# Patient Record
Sex: FEMALE | Race: WHITE | NOT HISPANIC OR LATINO | Employment: FULL TIME | URBAN - METROPOLITAN AREA
[De-identification: names, ages, dates, MRNs, and addresses within clinical notes are randomized per-mention and may not be internally consistent; named-entity substitution may affect disease eponyms.]

---

## 2023-11-01 ENCOUNTER — APPOINTMENT (OUTPATIENT)
Dept: RADIOLOGY | Facility: CLINIC | Age: 48
End: 2023-11-01
Payer: COMMERCIAL

## 2023-11-07 ENCOUNTER — TELEPHONE (OUTPATIENT)
Dept: ORTHOPEDIC SURGERY | Facility: CLINIC | Age: 48
End: 2023-11-07
Payer: COMMERCIAL

## 2023-11-07 NOTE — TELEPHONE ENCOUNTER
Patient called and left a voicemail saying that Dr. Plaza ordered a MRI of the knee for her and she did not go and get it done because she was feeling better. But now she is having pain again. So she tried to schedule for the MRI but the insurance wont cover it they denied it. She wants to know what can she do to get the MRI approved.      Patient can be reached at [phone]  Thank you

## 2023-11-09 NOTE — TELEPHONE ENCOUNTER
Talked to pt and let her know that I reached out to our precert dept to see what needs to be done , to get it approved

## 2023-11-10 DIAGNOSIS — M17.11 OSTEOARTHRITIS OF RIGHT KNEE, UNSPECIFIED OSTEOARTHRITIS TYPE: Primary | ICD-10-CM

## 2023-11-10 DIAGNOSIS — M25.569 KNEE PAIN, UNSPECIFIED CHRONICITY, UNSPECIFIED LATERALITY: Primary | ICD-10-CM

## 2023-12-04 ENCOUNTER — OFFICE VISIT (OUTPATIENT)
Dept: ORTHOPEDIC SURGERY | Facility: CLINIC | Age: 48
End: 2023-12-04
Payer: COMMERCIAL

## 2023-12-04 DIAGNOSIS — S83.231D COMPLEX TEAR OF MEDIAL MENISCUS OF RIGHT KNEE AS CURRENT INJURY, SUBSEQUENT ENCOUNTER: Primary | ICD-10-CM

## 2023-12-04 PROCEDURE — 99214 OFFICE O/P EST MOD 30 MIN: CPT | Performed by: ORTHOPAEDIC SURGERY

## 2023-12-04 NOTE — PROGRESS NOTES
History of Present Illness   Chief Complaint   Patient presents with    Right Knee - Follow-up     Internal derangement  MRI REVIEW today       History of Present Illness   Patient presents after sustaining an injury to their knee.  They are complaining of ache, pain and discomfort.  The knee continues to swell since the injury.  Patient also complains of mechanical symptoms of catching, locking and popping.  Pain is aggravated by use and relieved by rest.  She been treated conservatively since initial visit on 6/19/2023.  Despite conservative treatment pain is persisted.  Imaging  Radiographs Knee: No acute fractures or dislocations.  No arthritic change and well-preserved joint space  MRI: Posterior horn medial meniscus tear.  She has a small flap tear near the posterior horn/root.  Root appears to be intact.  Assessment:   Right knee posterior medial meniscus tear    Plan:  We reviewed the role of imaging, physical therapy, injections and the time frame to healing and correlation with outcome.  Menisectomy plan of care:  Risks benefits and alternatives to surgery were discussed including but not limited to Infection, bleeding, neurovascular injury, pain and dysfunction, hardware related complications including cutout failure breakage, loss of function, motion, and permanent disability as well as the cardiovascular and pulmonary complications from anesthesia including death and DVT. Patient and family accept these risks.  We discussed specifically post meniscectomy pain, incomplete pain relief, meniscus retear, progressive arthritis, intraoperative decisions in regards to other pathology, and the potential for future revision surgeries including arthroplasty  We discussed with her specifically does look like it is a posterior horn tear does not look like a root.  However there is intraoperative decision we may need to make if indeed she has a full-thickness root tear.  She is okay with proceeding with repair if  indicated.  We discussed this in the preoperative area as well  Trip to [place] in January.    Plan for outpatient surgery   1. 1 week postop follow up   2. Percocet for postop pain relief. OARRS has been reviewed and is consistent with prescribed medications. This report is scanned into the electronic medical record. The risks of abuse, dependence, addiction and diversion were considered. The medication is felt to be clinically appropriate based on documented diagnosis .  3.  Pre-CERT for removal postoperative knee brace  Physical Exam  Well-nourished, well-developed. No acute distress. Alert and oriented x3. Responds appropriately to questioning. Good mood. Normal affect.  Physical Exam  Right knee:  Skin healthy and intact  No gross swelling or ecchymosis  Trace to 1+ Effusion:   ROM: 110  Crepitance with range of motion  Pain along the joint line.  Positive Ángel´s test/PositiveApley Grind  Neurovascular exam normal distally  Palpable pedal pulse and good cap refill     Review of Systems   GENERAL: Negative for malaise, significant weight loss, fever  MUSCULOSKELETAL: See HPI  NEURO:  Negative     Past Medical History:   Diagnosis Date    Ganglion, unspecified site     Ganglion cyst       Medication Documentation Review Audit    **Prior to Admission medications have not yet been reviewed**         Not on File    Social History     Socioeconomic History    Marital status:      Spouse name: Not on file    Number of children: Not on file    Years of education: Not on file    Highest education level: Not on file   Occupational History    Not on file   Tobacco Use    Smoking status: Not on file    Smokeless tobacco: Not on file   Substance and Sexual Activity    Alcohol use: Not on file    Drug use: Not on file    Sexual activity: Not on file   Other Topics Concern    Not on file   Social History Narrative    Not on file     Social Determinants of Health     Financial Resource Strain: Not on file   Food  Insecurity: Not on file   Transportation Needs: Not on file   Physical Activity: Not on file   Stress: Not on file   Social Connections: Not on file   Intimate Partner Violence: Not on file   Housing Stability: Not on file       No past surgical history on file.    No results found.

## 2023-12-14 ENCOUNTER — HOSPITAL ENCOUNTER (OUTPATIENT)
Facility: HOSPITAL | Age: 48
Setting detail: OUTPATIENT SURGERY
End: 2023-12-14
Attending: ORTHOPAEDIC SURGERY | Admitting: ORTHOPAEDIC SURGERY
Payer: COMMERCIAL

## 2023-12-14 PROBLEM — S83.241A OTHER TEAR OF MEDIAL MENISCUS, CURRENT INJURY, RIGHT KNEE, INITIAL ENCOUNTER: Status: ACTIVE | Noted: 2023-12-12

## 2024-01-09 ENCOUNTER — TELEPHONE (OUTPATIENT)
Dept: ORTHOPEDIC SURGERY | Facility: CLINIC | Age: 49
End: 2024-01-09
Payer: COMMERCIAL

## 2024-01-09 NOTE — TELEPHONE ENCOUNTER
01/09/24  VM for pt to contact our office re availability of crutches for post-op use following knee sx.  Asked her to let us know whether she has, or needs a pair.

## 2024-01-16 ENCOUNTER — TELEPHONE (OUTPATIENT)
Dept: ORTHOPEDIC SURGERY | Facility: CLINIC | Age: 49
End: 2024-01-16
Payer: COMMERCIAL

## 2024-01-22 DIAGNOSIS — S83.231D COMPLEX TEAR OF MEDIAL MENISCUS OF RIGHT KNEE AS CURRENT INJURY, SUBSEQUENT ENCOUNTER: Primary | ICD-10-CM

## 2024-01-22 RX ORDER — OXYCODONE AND ACETAMINOPHEN 5; 325 MG/1; MG/1
1 TABLET ORAL EVERY 6 HOURS PRN
Qty: 28 TABLET | Refills: 0 | Status: CANCELLED | OUTPATIENT
Start: 2024-01-22 | End: 2024-01-29

## 2024-01-23 ENCOUNTER — APPOINTMENT (OUTPATIENT)
Dept: ORTHOPEDIC SURGERY | Facility: CLINIC | Age: 49
End: 2024-01-23
Payer: COMMERCIAL

## 2024-02-02 ENCOUNTER — APPOINTMENT (OUTPATIENT)
Dept: ORTHOPEDIC SURGERY | Facility: CLINIC | Age: 49
End: 2024-02-02
Payer: COMMERCIAL

## 2025-02-07 NOTE — TELEPHONE ENCOUNTER
01/16/24  spoke w/ pt and she does need crutches.  Scheduled her for S.V. on 01/23/24.   Addended by: NERY TAYLOR on: 2/7/2025 01:59 PM     Modules accepted: Orders

## 2025-05-20 ENCOUNTER — APPOINTMENT (OUTPATIENT)
Dept: PRIMARY CARE | Facility: CLINIC | Age: 50
End: 2025-05-20
Payer: COMMERCIAL

## 2025-05-20 VITALS
DIASTOLIC BLOOD PRESSURE: 74 MMHG | TEMPERATURE: 97.3 F | HEART RATE: 85 BPM | BODY MASS INDEX: 33.67 KG/M2 | WEIGHT: 202.13 LBS | HEIGHT: 65 IN | SYSTOLIC BLOOD PRESSURE: 106 MMHG

## 2025-05-20 DIAGNOSIS — Z23 IMMUNIZATION DUE: ICD-10-CM

## 2025-05-20 DIAGNOSIS — F41.9 ANXIETY: ICD-10-CM

## 2025-05-20 DIAGNOSIS — F90.0 ATTENTION DEFICIT HYPERACTIVITY DISORDER (ADHD), PREDOMINANTLY INATTENTIVE TYPE: ICD-10-CM

## 2025-05-20 DIAGNOSIS — M67.40 GANGLION CYST: Primary | ICD-10-CM

## 2025-05-20 DIAGNOSIS — Z00.00 WELLNESS EXAMINATION: ICD-10-CM

## 2025-05-20 DIAGNOSIS — Z12.11 ENCOUNTER FOR SCREENING FOR MALIGNANT NEOPLASM OF COLON: ICD-10-CM

## 2025-05-20 DIAGNOSIS — Z51.81 ENCOUNTER FOR MEDICATION MONITORING: ICD-10-CM

## 2025-05-20 DIAGNOSIS — Z13.0 SCREENING FOR DEFICIENCY ANEMIA: ICD-10-CM

## 2025-05-20 DIAGNOSIS — L84 CALLUS OF FOOT: ICD-10-CM

## 2025-05-20 DIAGNOSIS — Z13.220 SCREENING FOR LIPID DISORDERS: ICD-10-CM

## 2025-05-20 DIAGNOSIS — Z23 ENCOUNTER FOR ADMINISTRATION OF VACCINE: ICD-10-CM

## 2025-05-20 DIAGNOSIS — D68.51 HOMOZYGOUS FACTOR V LEIDEN MUTATION (MULTI): ICD-10-CM

## 2025-05-20 DIAGNOSIS — Z12.31 ENCOUNTER FOR SCREENING MAMMOGRAM FOR MALIGNANT NEOPLASM OF BREAST: ICD-10-CM

## 2025-05-20 PROBLEM — S83.249A TEAR OF MEDIAL MENISCUS OF KNEE: Status: ACTIVE | Noted: 2023-12-12

## 2025-05-20 PROBLEM — F32.A DEPRESSIVE DISORDER: Status: ACTIVE | Noted: 2025-05-20

## 2025-05-20 PROBLEM — R45.851 SUICIDAL IDEATION: Status: RESOLVED | Noted: 2017-10-12 | Resolved: 2025-05-20

## 2025-05-20 PROCEDURE — 99204 OFFICE O/P NEW MOD 45 MIN: CPT

## 2025-05-20 PROCEDURE — 90471 IMMUNIZATION ADMIN: CPT

## 2025-05-20 PROCEDURE — 3008F BODY MASS INDEX DOCD: CPT

## 2025-05-20 PROCEDURE — 1036F TOBACCO NON-USER: CPT

## 2025-05-20 PROCEDURE — 90715 TDAP VACCINE 7 YRS/> IM: CPT

## 2025-05-20 RX ORDER — DEXTROAMPHETAMINE SACCHARATE, AMPHETAMINE ASPARTATE, DEXTROAMPHETAMINE SULFATE AND AMPHETAMINE SULFATE 5; 5; 5; 5 MG/1; MG/1; MG/1; MG/1
1 TABLET ORAL 3 TIMES DAILY
COMMUNITY

## 2025-05-20 RX ORDER — ALPRAZOLAM 0.5 MG/1
TABLET ORAL
COMMUNITY

## 2025-05-20 ASSESSMENT — PATIENT HEALTH QUESTIONNAIRE - PHQ9
2. FEELING DOWN, DEPRESSED OR HOPELESS: NOT AT ALL
1. LITTLE INTEREST OR PLEASURE IN DOING THINGS: NOT AT ALL
SUM OF ALL RESPONSES TO PHQ9 QUESTIONS 1 AND 2: 0

## 2025-05-20 NOTE — ASSESSMENT & PLAN NOTE
She has a history of these from approximately 1997. Appears there is a new one that has been present for the last few months; she reports its increased in size and is causing more discomfort and irritation. She would like this removed.     Orders:    Referral to Orthopedics and Sports Medicine; Future

## 2025-05-20 NOTE — PROGRESS NOTES
"Efrain Deras \"Iris\" is a 49 y.o. female who presents for New Patient Visit (Growth on toe, thumb and side of upper abd/Thumb bothers more- thumb has got bigger, toe and thumb have gone down/Thumb causes pain).  She is here for a comprehensive wellness examination and wants to address her cysts. Former smoker ; Father history of blood clots multiple strokes including brain stem stroke he is now , mother has scleroderma, factor V, grandparents maternal and paternal + for type 2 diabetes.         ROS negative unless otherwise stated in HPI.     /74   Pulse 85   Temp 36.3 °C (97.3 °F) (Temporal)   Ht 1.651 m (5' 5\")   Wt 91.7 kg (202 lb 2 oz)   LMP 2025 Comment: irregular  BMI 33.64 kg/m²    Objective        Physical Exam  Constitutional:       Appearance: Normal appearance.   HENT:      Head: Normocephalic.      Right Ear: Tympanic membrane, ear canal and external ear normal. There is no impacted cerumen.      Left Ear: Tympanic membrane, ear canal and external ear normal. There is no impacted cerumen.   Cardiovascular:      Rate and Rhythm: Normal rate and regular rhythm.   Pulmonary:      Effort: Pulmonary effort is normal. No respiratory distress.      Breath sounds: Normal breath sounds. No wheezing, rhonchi or rales.   Musculoskeletal:      Cervical back: Neck supple.      Comments: Right Thumb: firm well defined circumoral palpable nodule; non-boggy; no surrounding edema, erythema or purulent drainage    Right 2nd Toe: hardened keratosis over bony prominence of distal metatarsal joint   Skin:     General: Skin is warm and dry.   Neurological:      Mental Status: She is alert and oriented to person, place, and time.   Psychiatric:         Mood and Affect: Mood normal.         Assessment & Plan  Ganglion cyst  She has a history of these from approximately . Appears there is a new one that has been present for the last few months; she reports its increased in size " and is causing more discomfort and irritation. She would like this removed.     Orders:    Referral to Orthopedics and Sports Medicine; Future    Callus of foot  Right 2nd toe distal metatarsal joint; may soften with foot soak and file. No other concern today.        Attention deficit hyperactivity disorder (ADHD), predominantly inattentive type  Jet is managing; currently controlled with adderall         Anxiety  For flight anxiety; not an active continuous problem         Encounter for screening for malignant neoplasm of colon    Orders:    Colonoscopy Screening; Average Risk Patient; Future    Encounter for screening mammogram for malignant neoplasm of breast    Orders:    BI mammo bilateral screening tomosynthesis; Future    Screening for deficiency anemia    Orders:    CBC and Auto Differential; Future    Screening for lipid disorders    Orders:    Lipid Panel; Future    Encounter for medication monitoring    Orders:    Comprehensive Metabolic Panel; Future    Wellness examination  Current Plans  · You are advised to get a flu shot annually  · You should eat a healthy diet and get regular exercise.  · You should see your dentist regularly every 6 months for dental health checkups unless you have had your teeth removed.  · Follow up in 1 year or as needed    Orders:    Referral to Obstetrics / Gynecology; Future    Follow Up In [facility] - PCP - Health Maintenance; Future    Immunization due    Orders:    Tdap vaccine, age 7 years and older    Encounter for administration of vaccine  Ordered titer to be drawn prior to any MMR vaccine administration to confirm immunity status. Educated patient on the topic. Answered all questions while she was here for her visit. Addressed concerns and gave referrals for routine health screening/maintenance as well as a referral for orthopedic hand as the cyst on her thumb is causing pain/discomfort.   Orders:    Measles ([place]) Antibody, IgG; Future

## 2025-05-22 ENCOUNTER — HOSPITAL ENCOUNTER (OUTPATIENT)
Dept: RADIOLOGY | Facility: CLINIC | Age: 50
Discharge: HOME | End: 2025-05-22
Payer: COMMERCIAL

## 2025-05-22 VITALS — BODY MASS INDEX: 33.67 KG/M2 | HEIGHT: 65 IN | WEIGHT: 202.13 LBS

## 2025-05-22 DIAGNOSIS — Z12.31 ENCOUNTER FOR SCREENING MAMMOGRAM FOR MALIGNANT NEOPLASM OF BREAST: ICD-10-CM

## 2025-05-22 PROCEDURE — 77067 SCR MAMMO BI INCL CAD: CPT

## 2025-05-27 ENCOUNTER — HOSPITAL ENCOUNTER (OUTPATIENT)
Dept: RADIOLOGY | Facility: CLINIC | Age: 50
Discharge: HOME | End: 2025-05-27
Payer: COMMERCIAL

## 2025-05-27 ENCOUNTER — OFFICE VISIT (OUTPATIENT)
Dept: ORTHOPEDIC SURGERY | Facility: CLINIC | Age: 50
End: 2025-05-27
Payer: COMMERCIAL

## 2025-05-27 DIAGNOSIS — R92.8 ABNORMAL MAMMOGRAM: Primary | ICD-10-CM

## 2025-05-27 DIAGNOSIS — M67.431 GANGLION OF RIGHT WRIST: ICD-10-CM

## 2025-05-27 DIAGNOSIS — R22.31 SUBCUTANEOUS MASS OF RIGHT THUMB: ICD-10-CM

## 2025-05-27 PROCEDURE — 99214 OFFICE O/P EST MOD 30 MIN: CPT | Performed by: ORTHOPAEDIC SURGERY

## 2025-05-27 PROCEDURE — 73140 X-RAY EXAM OF FINGER(S): CPT | Mod: RT

## 2025-05-27 PROCEDURE — 99212 OFFICE O/P EST SF 10 MIN: CPT | Performed by: ORTHOPAEDIC SURGERY

## 2025-05-27 PROCEDURE — 73140 X-RAY EXAM OF FINGER(S): CPT | Mod: RIGHT SIDE | Performed by: ORTHOPAEDIC SURGERY

## 2025-05-27 NOTE — PROGRESS NOTES
5/27/2025    Chief Complaint   Patient presents with    Right Wrist - Ganglion Cyst, New Patient Visit     RT. WRIST   XRAY TODAY        History of Present Illness:  Patient Annette Deras , 49 y.o. female, presents today, 5/27/2025, for evaluation of right thumb pain and mass formation.  Patient first noticed symptoms a few months ago and reports that thumb mass has been steadily enlarging since that time.  No discrete injury or trauma associated symptom onset.  Is it is enlarged has become more tender and painful.  Frequently gets in her way if she strikes it on things.  It is starting to cause some limitation of her range of motion across the IP joint of the right thumb.  She is right-hand dominant individual, she has history of ADHD, anxiety, and factor V Leiden mutation.  She has history of ganglion cyst to the right wrist and has had 3 surgeries for resection in the past.  She is inquire about surgical resection of her thumb mass as well.       Review of Systems:   GENERAL: Negative  GI: Negative  MUSCULOSKELETAL: See HPI  SKIN: Negative  NEURO:  Negative     Physical Exam:  GENERAL:  Alert and oriented to person, place, and time.  No acute distress and breathing comfortably; pleasant and cooperative with the examination.  HEENT:  Head is normocephalic and atraumatic.  NECK:  Supple, no visible swelling.  CARDIOVASCULAR:  No palpable tachycardia.  LUNGS:  No audible wheezing or labored breathing.  ABDOMEN:  Nondistended.  Extremities: Evaluation of the right upper extremity finds the patient to have a palpable radial artery at the wrist with brisk capillary refill to all digits. The patient has intact sensorium to axillary, radial, median and ulnar nerves. There are no open wounds. There are no signs of infection. There is no evidence of lymphedema or lymphatic streaking. The patient has supple compartments of the right arm, forearm and hand.  There is tender palpable painful mass over the dorsal radial  aspect of the right thumb overlying the interphalangeal joint.  It is about 1/2 cm x 1 cm.  It is boggy and soft in nature.     Imaging/Test Results:  3 views of the thumb show no acute fracture or dislocation.  Adequate alignment in all planes.  There is evidence of some associated soft tissue swelling overlying region of the mass.     Assessment:  Painful and steadily enlarging right thumb mass.     Plan:  Treatment options were discussed including both operative and non-operative treatment strategies.  Patient elects to proceed forth with surgery by way of right thumb mass excision under digital block anesthesia.  Risks, benefits, and alternatives to surgery were discussed including, but not limited to infection risk, persistent or incomplete relief of pain, swelling, or stiffness, and post-operative pain and discomfort.  The patient verbalized agreement and understanding of the plan for care.  All questions answered at today's visit.  Plan for follow-up 10-14 days post-op.    In a face to face encounter, I performed a history and physical examination, discussed pertinent diagnostic studies if indicated, and discussed diagnosis and management strategies with both the patient and the mid-level provider. I reviewed the mid-level's note and agree with the documented findings and plan of care.  Patient presents today for evaluation of the right thumb.  Patient describes a gradually enlarging mass over the dorsal aspect of the right thumb centered at the interphalangeal joint.  No discrete injury.  X-rays demonstrate early arthritic change.  There is a boggy minimally tender mass over the dorsal aspect of the right thumb spanning extensor zones 1 and 2.  We explained that most likely this represents a cystic structure arising from the joint secondary to the baseline of arthritis.  We talked about operative and nonoperative strategies.  Patient elects for right thumb excision mass with debridement of osseous spurring  from the proximal and distal phalanx of the right thumb.  Plan for surgery under digital block anesthesia.  Patient is agreeable with this strategy.

## 2025-06-02 ENCOUNTER — HOSPITAL ENCOUNTER (OUTPATIENT)
Dept: RADIOLOGY | Facility: HOSPITAL | Age: 50
Discharge: HOME | End: 2025-06-02
Payer: COMMERCIAL

## 2025-06-02 DIAGNOSIS — R92.8 ABNORMAL MAMMOGRAM: ICD-10-CM

## 2025-06-16 DIAGNOSIS — G89.18 POST-OP PAIN: Primary | ICD-10-CM

## 2025-06-16 RX ORDER — HYDROCODONE BITARTRATE AND ACETAMINOPHEN 5; 325 MG/1; MG/1
1 TABLET ORAL EVERY 8 HOURS PRN
Qty: 10 TABLET | Refills: 0 | Status: SHIPPED | OUTPATIENT
Start: 2025-06-19 | End: 2025-06-23

## 2025-06-16 RX ORDER — HYDROCODONE BITARTRATE AND ACETAMINOPHEN 5; 325 MG/1; MG/1
1 TABLET ORAL EVERY 8 HOURS PRN
Qty: 10 TABLET | Refills: 0 | Status: SHIPPED | OUTPATIENT
Start: 2025-06-16 | End: 2025-06-16

## 2025-06-18 ENCOUNTER — HOSPITAL ENCOUNTER (OUTPATIENT)
Dept: RADIOLOGY | Facility: HOSPITAL | Age: 50
Discharge: HOME | End: 2025-06-18
Payer: COMMERCIAL

## 2025-06-18 PROCEDURE — 76642 ULTRASOUND BREAST LIMITED: CPT | Mod: RIGHT SIDE | Performed by: RADIOLOGY

## 2025-06-18 PROCEDURE — 77061 BREAST TOMOSYNTHESIS UNI: CPT | Mod: RIGHT SIDE | Performed by: RADIOLOGY

## 2025-06-18 PROCEDURE — 77065 DX MAMMO INCL CAD UNI: CPT | Mod: RIGHT SIDE | Performed by: RADIOLOGY

## 2025-06-18 PROCEDURE — 77065 DX MAMMO INCL CAD UNI: CPT | Mod: RT

## 2025-06-18 PROCEDURE — 76642 ULTRASOUND BREAST LIMITED: CPT | Mod: RT

## 2025-06-20 PROCEDURE — 26236 PARTIAL REMOVAL FINGER BONE: CPT | Performed by: ORTHOPAEDIC SURGERY

## 2025-06-20 PROCEDURE — 26235 PARTIAL REMOVAL FINGER BONE: CPT | Performed by: ORTHOPAEDIC SURGERY

## 2025-06-26 ENCOUNTER — OFFICE VISIT (OUTPATIENT)
Dept: ORTHOPEDIC SURGERY | Facility: CLINIC | Age: 50
End: 2025-06-26
Payer: COMMERCIAL

## 2025-06-26 ENCOUNTER — TELEPHONE (OUTPATIENT)
Dept: ORTHOPEDIC SURGERY | Facility: CLINIC | Age: 50
End: 2025-06-26
Payer: COMMERCIAL

## 2025-06-26 DIAGNOSIS — R22.31 SUBCUTANEOUS MASS OF RIGHT THUMB: Primary | ICD-10-CM

## 2025-06-26 PROCEDURE — 99213 OFFICE O/P EST LOW 20 MIN: CPT | Performed by: ORTHOPAEDIC SURGERY

## 2025-06-26 PROCEDURE — 99024 POSTOP FOLLOW-UP VISIT: CPT | Performed by: ORTHOPAEDIC SURGERY

## 2025-06-26 NOTE — TELEPHONE ENCOUNTER
Patient called stating her bandage from her thumb surgery is really dirty from the heat ect and asked if she can come in to get it changed since she was told to leave it on until her post op apt on 07/03. I spoke to Dr. Wilson's team and they advised that she can come into the walk in clinic and have it change. I told the patient this info and patient stated she will be coming in today to do so.

## 2025-07-03 ENCOUNTER — OFFICE VISIT (OUTPATIENT)
Dept: ORTHOPEDIC SURGERY | Facility: CLINIC | Age: 50
End: 2025-07-03
Payer: COMMERCIAL

## 2025-07-03 DIAGNOSIS — R22.31 SUBCUTANEOUS MASS OF RIGHT THUMB: Primary | ICD-10-CM

## 2025-07-03 PROCEDURE — 99213 OFFICE O/P EST LOW 20 MIN: CPT | Performed by: ORTHOPAEDIC SURGERY

## 2025-07-03 NOTE — PROGRESS NOTES
Patient presents today for ongoing follow-up of the right upper extremity.  She underwent excision of right thumb interphalangeal joint mucous cyst with debridement of osseous spurring from proximal and distal phalanx.  Wound looks great.  A bit apprehensive through flexion and extension but EPL FPL intact.  She was instructed on scar desensitization slow return back to activities to tolerance.  She understands there is risk for recurrence.  She understands there is a baseline of IP arthritic change that could be painful over time.  Understanding these and she knows to return to the office if she has symptoms but otherwise to follow-up with me on an as-needed basis.  Sutures were removed and the patient tolerated that well.

## 2025-11-06 ENCOUNTER — APPOINTMENT (OUTPATIENT)
Dept: GASTROENTEROLOGY | Facility: EXTERNAL LOCATION | Age: 50
End: 2025-11-06
Payer: COMMERCIAL

## 2025-12-04 ENCOUNTER — APPOINTMENT (OUTPATIENT)
Dept: OBSTETRICS AND GYNECOLOGY | Facility: CLINIC | Age: 50
End: 2025-12-04
Payer: COMMERCIAL

## 2026-05-26 ENCOUNTER — APPOINTMENT (OUTPATIENT)
Dept: PRIMARY CARE | Facility: CLINIC | Age: 51
End: 2026-05-26
Payer: COMMERCIAL